# Patient Record
Sex: FEMALE | Race: WHITE | NOT HISPANIC OR LATINO | Employment: UNEMPLOYED
[De-identification: names, ages, dates, MRNs, and addresses within clinical notes are randomized per-mention and may not be internally consistent; named-entity substitution may affect disease eponyms.]

---

## 2017-05-11 ENCOUNTER — CHARTING TRANS (OUTPATIENT)
Dept: OTHER | Age: 32
End: 2017-05-11

## 2018-11-03 VITALS
OXYGEN SATURATION: 98 % | WEIGHT: 171.08 LBS | TEMPERATURE: 99 F | HEART RATE: 94 BPM | RESPIRATION RATE: 18 BRPM | HEIGHT: 70 IN | BODY MASS INDEX: 24.49 KG/M2

## 2020-03-07 ENCOUNTER — WALK IN (OUTPATIENT)
Dept: URGENT CARE | Age: 35
End: 2020-03-07

## 2020-03-07 VITALS
WEIGHT: 196.43 LBS | HEART RATE: 78 BPM | BODY MASS INDEX: 29.77 KG/M2 | HEIGHT: 68 IN | DIASTOLIC BLOOD PRESSURE: 70 MMHG | TEMPERATURE: 98.4 F | SYSTOLIC BLOOD PRESSURE: 110 MMHG | RESPIRATION RATE: 18 BRPM

## 2020-03-07 DIAGNOSIS — Z02.1 PRE-EMPLOYMENT EXAMINATION: Primary | ICD-10-CM

## 2020-03-07 PROCEDURE — X0945 SELF PAY APN OR PA PERFORMED ADMINISTRATIVE PHYSICAL: HCPCS | Performed by: NURSE PRACTITIONER

## 2020-03-07 SDOH — HEALTH STABILITY: MENTAL HEALTH: HOW OFTEN DO YOU HAVE A DRINK CONTAINING ALCOHOL?: NEVER

## 2020-03-07 ASSESSMENT — ENCOUNTER SYMPTOMS
EYES NEGATIVE: 1
PSYCHIATRIC NEGATIVE: 1
NEUROLOGICAL NEGATIVE: 1
RESPIRATORY NEGATIVE: 1
ALLERGIC/IMMUNOLOGIC NEGATIVE: 1
HEMATOLOGIC/LYMPHATIC NEGATIVE: 1
ENDOCRINE NEGATIVE: 1
GASTROINTESTINAL NEGATIVE: 1
CONSTITUTIONAL NEGATIVE: 1

## 2024-10-31 ENCOUNTER — OFFICE VISIT (OUTPATIENT)
Dept: RHEUMATOLOGY | Facility: CLINIC | Age: 39
End: 2024-10-31
Payer: MEDICAID

## 2024-10-31 VITALS
SYSTOLIC BLOOD PRESSURE: 114 MMHG | HEIGHT: 69 IN | HEART RATE: 100 BPM | BODY MASS INDEX: 30.07 KG/M2 | OXYGEN SATURATION: 97 % | WEIGHT: 203 LBS | RESPIRATION RATE: 16 BRPM | DIASTOLIC BLOOD PRESSURE: 60 MMHG | TEMPERATURE: 99 F

## 2024-10-31 DIAGNOSIS — M25.50 ARTHRALGIA, UNSPECIFIED JOINT: ICD-10-CM

## 2024-10-31 DIAGNOSIS — R76.8 POSITIVE ANA (ANTINUCLEAR ANTIBODY): Primary | ICD-10-CM

## 2024-10-31 DIAGNOSIS — E06.3 HASHIMOTO THYROIDITIS: ICD-10-CM

## 2024-10-31 PROBLEM — R05.1 ACUTE COUGH: Status: ACTIVE | Noted: 2022-12-19

## 2024-10-31 PROBLEM — R89.9 ABNORMAL LABORATORY TEST RESULT: Status: ACTIVE | Noted: 2024-06-08

## 2024-10-31 PROBLEM — N92.6 MISSED PERIODS: Status: ACTIVE | Noted: 2024-06-08

## 2024-10-31 PROBLEM — H81.10 BENIGN PAROXYSMAL POSITIONAL VERTIGO: Status: ACTIVE | Noted: 2020-07-27

## 2024-10-31 PROBLEM — R20.2 NUMBNESS AND TINGLING SENSATION OF SKIN: Status: ACTIVE | Noted: 2022-03-01

## 2024-10-31 PROBLEM — R87.810 CERVICAL HIGH RISK HUMAN PAPILLOMAVIRUS (HPV) DNA TEST POSITIVE: Status: ACTIVE | Noted: 2017-07-31

## 2024-10-31 PROBLEM — M25.539 PAIN IN WRIST: Status: ACTIVE | Noted: 2024-10-31

## 2024-10-31 PROBLEM — E07.9 THYROID DISEASE: Status: ACTIVE | Noted: 2020-06-10

## 2024-10-31 PROBLEM — R07.89 CHEST WALL PAIN: Status: ACTIVE | Noted: 2024-10-31

## 2024-10-31 PROBLEM — E55.9 VITAMIN D DEFICIENCY: Status: ACTIVE | Noted: 2020-07-27

## 2024-10-31 PROBLEM — E78.00 HYPERCHOLESTEROLEMIA: Status: ACTIVE | Noted: 2022-11-10

## 2024-10-31 PROBLEM — E03.9 HYPOTHYROIDISM: Status: ACTIVE | Noted: 2020-07-27

## 2024-10-31 PROBLEM — R20.0 NUMBNESS AND TINGLING SENSATION OF SKIN: Status: ACTIVE | Noted: 2022-03-01

## 2024-10-31 PROBLEM — E66.9 OBESITY: Status: ACTIVE | Noted: 2020-01-13

## 2024-10-31 PROCEDURE — 99203 OFFICE O/P NEW LOW 30 MIN: CPT | Performed by: INTERNAL MEDICINE

## 2024-10-31 RX ORDER — FERROUS SULFATE 325(65) MG
325 TABLET ORAL DAILY
COMMUNITY

## 2024-10-31 RX ORDER — ALBUTEROL SULFATE 90 UG/1
INHALANT RESPIRATORY (INHALATION)
COMMUNITY
Start: 2023-10-26

## 2024-10-31 RX ORDER — CETIRIZINE HYDROCHLORIDE 10 MG/1
1 TABLET ORAL DAILY
COMMUNITY
Start: 2022-05-08

## 2024-10-31 RX ORDER — CYCLOBENZAPRINE HCL 5 MG
TABLET ORAL
COMMUNITY
Start: 2024-06-13

## 2024-10-31 RX ORDER — FLUTICASONE PROPIONATE AND SALMETEROL 50; 250 UG/1; UG/1
POWDER RESPIRATORY (INHALATION)
COMMUNITY
Start: 2024-10-16

## 2024-10-31 RX ORDER — LIOTHYRONINE SODIUM 5 UG/1
1 TABLET ORAL 2 TIMES DAILY
COMMUNITY
Start: 2024-10-28 | End: 2025-11-12

## 2024-10-31 RX ORDER — FLUTICASONE PROPIONATE 50 MCG
1 SPRAY, SUSPENSION (ML) NASAL 2 TIMES DAILY
COMMUNITY
Start: 2023-03-23

## 2024-10-31 NOTE — PROGRESS NOTES
Rheumatology New Patient Note  =====================================================================================================    Date of visit: 10/31/2024  ?  Chief complaint: +DEBORAH  Chief Complaint   Patient presents with    [facility]     New pt. Abnormal lab work. Diagnosed with Hashimotos. Some hip and knee pain. Eczema. Converted rapid score of zero     Referring (will send letter)  PCP  DUNIA Gilman MD  Fax: [phone]  Phone: [phone]      =====================================================================================================  HPI    Kirstin Montes De Oca is a 39 year old female     Here for evaluation of a positive DEBORAH  2014: diagnosed with hypothyroidism/hashimoto's.  TPO antibodies very elevated in 2021.  On thyroid repletion/supplementation currently    3 children, smooth labor and delivery  -2 miscarriages: early in 1st trimester. ~6-7 weeks    Denies current alopecia, malar rash, photosensitivity rash, discoid lesions, oral/nasal ulcers, pleuritic chest pain, arthritis, seizures/psychosis, Raynaud's, dry eyes/mouth, or blood clots.      14 point ROS negative except noted above    Medications:  Medications Ordered Prior to Encounter[1]    Past Medical History:  Past Medical History:    Hashimoto's disease     Past Surgical History:  History reviewed. No pertinent surgical history.  Family History:  History reviewed. No pertinent family history.  Social History:  Social History     Socioeconomic History    Marital status: Single   Tobacco Use    Smoking status: Never    Smokeless tobacco: Never   Substance and Sexual Activity    Alcohol use: Never    Drug use: Never     Social Drivers of Health     Financial Resource Strain: High Risk (10/21/2024)    Received from [facility]    Overall Financial Resource Strain (CARDIA)     Difficulty of Paying Living Expenses: Hard   Food Insecurity: Food Insecurity Present (10/21/2024)    Received from [place]  [facility]    Hunger Vital Sign     Worried About Running Out of Food in the Last Year: Sometimes true     Ran Out of Food in the Last Year: Often true   Transportation Needs: No Transportation Needs (10/21/2024)    Received from [facility]    PRAPARE - Transportation     Lack of Transportation (Medical): No     Lack of Transportation (Non-Medical): No   Physical Activity: Not on File (10/7/2022)    [facility] from [facility]    Physical Activity     Physical Activity: 0   Stress: Not on File (10/7/2022)    [facility]    Stress     Stress: 0   Social Connections: Not on File (9/9/2024)    Received from [facility]    Social Connections     Connectedness: 0   Housing Stability: Low Risk  (3/25/2024)    [facility] from [facility], [facility]    Housing Stability Vital Sign     Unable to Pay for Housing in the Last Year: No     Number of Places Lived in the Last Year: 1     Unstable Housing in the Last Year: No     ?  Allergies:  Allergies[2]      Objective    Vitals:    10/31/24 1414   BP: 114/60   Pulse: 100   Resp: 16   Temp: 98.9 °F (37.2 °C)   SpO2: 97%   Weight: 203 lb (92.1 kg)   Height: 5' 9\" (1.753 m)       GEN: NAD, well-nourished.   HEENT: Head: NCAT. Face: No lesions. Eyes: Conjunctiva clear. Sclera are anicteric. PERRLA. EOMs are full. Ears: The right and left ear canals are clear.  Nose: No external or internal nasal deformities. Nasal septum is midline. Mouth: The lips are within normal limits.  No oral ulcers Tongue is midline with no lesions. The oral cavity is clear.   Neck: Supple. No neck masses. No thyromegaly. No LAD, parotid or submandicular gland palpated.   CV: RRR, no mrg, S1/S2  PULM: CTAB, no wrr, easy effort  Extremities: No cyanosis, edema or deformities.   Neurologic: Strength, CN2-12 grossly intact   Psych: normal affect.   Skin: No lesions or rashes.  MSK: 28 joint count performed. No evidence of  synovitis in mcp, pip, dip, wrist, elbows, shoulders, hips, knees, ankles, mtp unless otherwise noted. Full ROM of elbows, wrists, knees.    Hands- No ulceration/lesions noted. No swelling in IPJs, no TTP or synovitis noted in joints of hand   Wrists- No pain with wrist flexion and extension. No swelling, erythema, or increased warmth.   Elbows- No swelling, erythema, or increased warmth.   Shoulders- FROM, abduction ~180 degrees bilaterally.    Knees- No swelling, erythema, or increased warmth. AROM flexion/extension ~0-180 degrees.    No valgus/varus laxities appreciated.   Ankles/Feet- No swelling, erythema, or increased warmth.      ?  Labs:      6/2024  DEBORAH is positive  CBC W differential WNL  Zinc WNL  Creatinine 0.59, rest of CMP WNL  DEBORAH 1: 160 homogenous  TSH WNL        12/6/21 12:29 PM    ANTI-TPO ANTIBODIES  <9.0 IU/.5 High        No results found for: \"WBC\", \"RBC\", \"HGB\", \"HCT\", \"PLT\", \"MPV\", \"MCV\", \"MCH\", \"MCHC\", \"RDW\", \"NEPRELIM\", \"NEUTABS\", \"LYMPHABS\", \"EOSABS\", \"BASABS\", \"NEUT\", \"LYMPH\", \"MON\", \"EOS\", \"BASO\", \"NEPERCENT\", \"LYPERCENT\", \"MOPERCENT\", \"EOPERCENT\", \"BAPERCENT\", \"NE\", \"LYMABS\", \"MOABSO\", \"EOABSO\", \"BAABSO\"  No results found for: \"GLU\", \"BUN\", \"BUNCREA\", \"CREATSERUM\", \"ANIONGAP\", \"GFR\", \"GFRNAA\", \"GFRAA\", \"CA\", \"OSMOCALC\", \"ALKPHO\", \"AST\", \"ALT\", \"ALKPHOS\", \"BILT\", \"TP\", \"ALB\", \"GLOBULIN\", \"AGRATIO\", \"NA\", \"K\", \"CL\", \"CO2\"      No results found for: \"ANATI\", \"DEBORAH\", \"ANAS\", \"ANASCRN\", \"ANASCRNRFLX\", \"MARSHALL\"  No results found for: \"SSA\", \"SSAUR\", \"ANTISSA\", \"SSA52\", \"SSA60\", \"SSADD\", \"SSB\", \"ANTISSB\"  No results found for: \"DSDNA\", \"ANTIDSDNA\", \"SMUD\", \"ANTISM\", \"SM\", \"RNP\", \"ANTIRNP\", \"SMITHRNP\"  No results found for: \"SCL70\", \"SCL\", \"ZFZLCBN69\"  No results found for: \"C3\", \"C4\"  No results found for: \"DRVVT\", \"LAINT\", \"PTTLUPUS\", \"LUPUSINTERP\", \"LA\", \"F2LX9PWUEZ\", \"O1LU2TZDZG\", \"C3FYUEBCUC\", \"D5ZJDNSQEG\"  No results found for: \"CARDIOLIPIGG\", \"CARDIOLIPIGM\", \"CARDIOLIPIGA\",  \"CARDIOIGA\", \"CARLIP\"      Additional Labs:    Radiology:    Radiology review:      =====================================================================================================  Assessment and Plan    Assessment:  1. Positive DEBORAH (antinuclear antibody)    2. Arthralgia, unspecified joint    3. Hashimoto thyroiditis      #Positive DEBORAH  -No clear features of a systemic autoimmune disease such as lupus or Sjogren's  -History of Hashimoto's with positive TPO antibody.  50% of patients with Hashimoto's will have a positive DEBORAH.  ?  Plan:  --[facility] ANAlyzer panel that includes: DEBORAH Screen,IFA, DNA (ds) Antibody, Crithidia IFA with Reflex to Titer, Chromatin, Sm, Sm/RNP Antibody RNP Antibody, Sjogren's Antibodies (SS-A, SS-B), Scleroderma Antibody (Scl-70), Myrtle-1 Antibody, Centromere B Antibody, Complement Component C3c and C4c, Cardiolipin Antibodies (IgA, IgG, IgM), Beta-2-Glycoprotein I Antibodies (IgG, IgA, IgM), Rheumatoid Factor (IgA, IgG, IgM), Cyclic Citrullinated Peptide (CCP) Antibody (IgG), 14.3.3 eta Protein, Thyroid Peroxidase Antibodies (TPO)  -Lupus anticoagulant, UPCR, U/A, iron studies, B12/folate, vitamin D    Rtc prn    Diagnoses and all orders for this visit:    Positive DEBORAH (antinuclear antibody)  -     ANAlyzeR DEBORAH, IFA with Reflex Titer/Pattern, Systemic Autoimmune Panel 1 [53563] [Q]  -     LUPUS ANTICOAGULANT REFLEX; Future  -     Vitamin D, 25-Hydroxy; Future  -     Urinalysis with Culture Reflex; Future  -     Protein,Total,Urine, Random; Future  -     Creatinine, Urine, Random; Future  -     Iron And Tibc; Future  -     Ferritin; Future  -     B12 AND FOLATE; Future    Arthralgia, unspecified joint  -     ANAlyzeR DEBORAH, IFA with Reflex Titer/Pattern, Systemic Autoimmune Panel 1 [36410] [Q]  -     LUPUS ANTICOAGULANT REFLEX; Future  -     Vitamin D, 25-Hydroxy; Future  -     Urinalysis with Culture Reflex; Future  -     Protein,Total,Urine, Random; Future  -     Creatinine, Urine, Random;  Future  -     Iron And Tibc; Future  -     Ferritin; Future  -     B12 AND FOLATE; Future    Hashimoto thyroiditis        No follow-ups on file.      The above plan of care, diagnosis, orders, and follow-up were discussed with the patient. Questions related to this recommended plan of care were answered.    Thank you for referring this delightful patient to me. Please feel free to contact me with any questions.     This report was performed utilizing speech recognition software technology. Despite proofreading, speech recognition errors could escape detection. If a word or phrase is confusing or out of context, please do not hesitate to call for   clarification.       Kind regards      Nader Jacobsen MD  [facility]         [1]   Current Outpatient Medications on File Prior to Visit   Medication Sig Dispense Refill    albuterol 108 (90 Base) MCG/ACT Inhalation Aero Soln INHALE 2 PUFFS INTO LUNGS EVERY 4 HOURS AS NEEDED FOR SHORTNESS OF BREATH FOR WHEEZING      cetirizine 10 MG Oral Tab Take 1 tablet (10 mg total) by mouth daily.      cyclobenzaprine 5 MG Oral Tab Take 1-2 tablets (5-10 mg total) by mouth.      fluticasone propionate 50 MCG/ACT Nasal Suspension 1 spray by Nasal route 2 (two) times daily.      ADVAIR DISKUS 250-50 MCG/ACT Inhalation Aerosol Powder, Breath Activated       liothyronine 5 MCG Oral Tab Take 1 tablet (5 mcg total) by mouth 2 (two) times daily.      Cholecalciferol 25 MCG (1000 UT) Oral Chew Tab Chew 1 tablet by mouth daily.      Ferrous Sulfate 325 (65 Fe) MG Oral Tab Take 1 tablet (325 mg total) by mouth daily.       No current facility-administered medications on file prior to visit.   [2]   Allergies  Allergen Reactions    Black Pepper Ext (Piper Nigrum) [Piper] ANAPHYLAXIS    Latex HIVES    Seasonal ANAPHYLAXIS